# Patient Record
Sex: MALE | Race: WHITE | NOT HISPANIC OR LATINO | Employment: OTHER | URBAN - METROPOLITAN AREA
[De-identification: names, ages, dates, MRNs, and addresses within clinical notes are randomized per-mention and may not be internally consistent; named-entity substitution may affect disease eponyms.]

---

## 2022-03-31 ENCOUNTER — HOSPITAL ENCOUNTER (EMERGENCY)
Facility: CLINIC | Age: 67
Discharge: HOME | End: 2022-03-31
Attending: EMERGENCY MEDICINE | Admitting: EMERGENCY MEDICINE
Payer: COMMERCIAL

## 2022-03-31 ENCOUNTER — NURSE TRIAGE (OUTPATIENT)
Dept: NURSING | Facility: CLINIC | Age: 67
End: 2022-03-31

## 2022-03-31 VITALS
DIASTOLIC BLOOD PRESSURE: 88 MMHG | TEMPERATURE: 97.7 F | RESPIRATION RATE: 13 BRPM | WEIGHT: 225 LBS | SYSTOLIC BLOOD PRESSURE: 139 MMHG | HEART RATE: 79 BPM | OXYGEN SATURATION: 96 %

## 2022-03-31 DIAGNOSIS — R42 DIZZINESS: ICD-10-CM

## 2022-03-31 DIAGNOSIS — I10 HYPERTENSION, UNSPECIFIED TYPE: ICD-10-CM

## 2022-03-31 LAB
ALBUMIN SERPL-MCNC: 4 G/DL (ref 3.5–5)
ALBUMIN UR-MCNC: NEGATIVE MG/DL
ALP SERPL-CCNC: 64 U/L (ref 45–120)
ALT SERPL W P-5'-P-CCNC: 28 U/L (ref 0–45)
ANION GAP SERPL CALCULATED.3IONS-SCNC: 10 MMOL/L (ref 5–18)
APPEARANCE UR: CLEAR
AST SERPL W P-5'-P-CCNC: 23 U/L (ref 0–40)
ATRIAL RATE - MUSE: 83 BPM
BASOPHILS # BLD AUTO: 0 10E3/UL (ref 0–0.2)
BASOPHILS NFR BLD AUTO: 1 %
BILIRUB SERPL-MCNC: 0.9 MG/DL (ref 0–1)
BILIRUB UR QL STRIP: NEGATIVE
BUN SERPL-MCNC: 13 MG/DL (ref 8–22)
CALCIUM SERPL-MCNC: 9.5 MG/DL (ref 8.5–10.5)
CHLORIDE BLD-SCNC: 101 MMOL/L (ref 98–107)
CO2 SERPL-SCNC: 22 MMOL/L (ref 22–31)
COLOR UR AUTO: COLORLESS
CREAT SERPL-MCNC: 0.83 MG/DL (ref 0.7–1.3)
DIASTOLIC BLOOD PRESSURE - MUSE: 108 MMHG
EOSINOPHIL # BLD AUTO: 0.2 10E3/UL (ref 0–0.7)
EOSINOPHIL NFR BLD AUTO: 3 %
ERYTHROCYTE [DISTWIDTH] IN BLOOD BY AUTOMATED COUNT: 12.8 % (ref 10–15)
GFR SERPL CREATININE-BSD FRML MDRD: >90 ML/MIN/1.73M2
GLUCOSE BLD-MCNC: 116 MG/DL (ref 70–125)
GLUCOSE UR STRIP-MCNC: NEGATIVE MG/DL
HCT VFR BLD AUTO: 36.7 % (ref 40–53)
HGB BLD-MCNC: 13.1 G/DL (ref 13.3–17.7)
HGB UR QL STRIP: NEGATIVE
IMM GRANULOCYTES # BLD: 0 10E3/UL
IMM GRANULOCYTES NFR BLD: 0 %
INR PPP: 0.98 (ref 0.85–1.15)
INTERPRETATION ECG - MUSE: NORMAL
KETONES UR STRIP-MCNC: NEGATIVE MG/DL
LEUKOCYTE ESTERASE UR QL STRIP: NEGATIVE
LYMPHOCYTES # BLD AUTO: 1.5 10E3/UL (ref 0.8–5.3)
LYMPHOCYTES NFR BLD AUTO: 24 %
MAGNESIUM SERPL-MCNC: 1.9 MG/DL (ref 1.8–2.6)
MCH RBC QN AUTO: 30.6 PG (ref 26.5–33)
MCHC RBC AUTO-ENTMCNC: 35.7 G/DL (ref 31.5–36.5)
MCV RBC AUTO: 86 FL (ref 78–100)
MONOCYTES # BLD AUTO: 1 10E3/UL (ref 0–1.3)
MONOCYTES NFR BLD AUTO: 16 %
NEUTROPHILS # BLD AUTO: 3.5 10E3/UL (ref 1.6–8.3)
NEUTROPHILS NFR BLD AUTO: 56 %
NITRATE UR QL: NEGATIVE
NRBC # BLD AUTO: 0 10E3/UL
NRBC BLD AUTO-RTO: 0 /100
P AXIS - MUSE: 31 DEGREES
PH UR STRIP: 6 [PH] (ref 5–7)
PLATELET # BLD AUTO: 191 10E3/UL (ref 150–450)
POTASSIUM BLD-SCNC: 3.8 MMOL/L (ref 3.5–5)
PR INTERVAL - MUSE: 168 MS
PROT SERPL-MCNC: 7.4 G/DL (ref 6–8)
QRS DURATION - MUSE: 78 MS
QT - MUSE: 358 MS
QTC - MUSE: 420 MS
R AXIS - MUSE: 28 DEGREES
RBC # BLD AUTO: 4.28 10E6/UL (ref 4.4–5.9)
RBC URINE: 0 /HPF
SODIUM SERPL-SCNC: 133 MMOL/L (ref 136–145)
SP GR UR STRIP: 1.01 (ref 1–1.03)
SYSTOLIC BLOOD PRESSURE - MUSE: 198 MMHG
T AXIS - MUSE: 57 DEGREES
TROPONIN I SERPL-MCNC: <0.01 NG/ML (ref 0–0.29)
UROBILINOGEN UR STRIP-MCNC: <2 MG/DL
VENTRICULAR RATE- MUSE: 83 BPM
WBC # BLD AUTO: 6.2 10E3/UL (ref 4–11)
WBC URINE: 1 /HPF

## 2022-03-31 PROCEDURE — 80053 COMPREHEN METABOLIC PANEL: CPT | Performed by: EMERGENCY MEDICINE

## 2022-03-31 PROCEDURE — 99284 EMERGENCY DEPT VISIT MOD MDM: CPT | Mod: 25

## 2022-03-31 PROCEDURE — 85610 PROTHROMBIN TIME: CPT | Performed by: EMERGENCY MEDICINE

## 2022-03-31 PROCEDURE — 85025 COMPLETE CBC W/AUTO DIFF WBC: CPT | Performed by: EMERGENCY MEDICINE

## 2022-03-31 PROCEDURE — 83735 ASSAY OF MAGNESIUM: CPT | Performed by: EMERGENCY MEDICINE

## 2022-03-31 PROCEDURE — 93005 ELECTROCARDIOGRAM TRACING: CPT | Performed by: EMERGENCY MEDICINE

## 2022-03-31 PROCEDURE — 81001 URINALYSIS AUTO W/SCOPE: CPT | Performed by: EMERGENCY MEDICINE

## 2022-03-31 PROCEDURE — 258N000003 HC RX IP 258 OP 636: Performed by: EMERGENCY MEDICINE

## 2022-03-31 PROCEDURE — 36415 COLL VENOUS BLD VENIPUNCTURE: CPT | Performed by: EMERGENCY MEDICINE

## 2022-03-31 PROCEDURE — 84484 ASSAY OF TROPONIN QUANT: CPT | Performed by: EMERGENCY MEDICINE

## 2022-03-31 PROCEDURE — 96360 HYDRATION IV INFUSION INIT: CPT

## 2022-03-31 RX ADMIN — SODIUM CHLORIDE 1000 ML: 9 INJECTION, SOLUTION INTRAVENOUS at 22:40

## 2022-04-01 NOTE — DISCHARGE INSTRUCTIONS
Follow-up with your primary care provider within the next week for recheck  Your medications as previously prescribed  Return to the emergency department for worsening problems or concerns  Call Dr. Hugo at [phone] if you have any questions until 6 AM

## 2022-04-01 NOTE — ED TRIAGE NOTES
Reports intermittent dizziness/lightheadedness since this morning. States this morning it was when he stood up and then tonight became worse. Reports it is worse when moving head side to side. States had a few days of diarrhea earlier this week. Neuro intact. Denies pain.

## 2022-04-01 NOTE — TELEPHONE ENCOUNTER
"Dominguez has been getting episodes of feeling lightheaded today.  He is currently in a vehicle with his spouse.   They had gone to [facility] ER and left after being notified of the long wait    Earlier today, Dominguez experienced intermittent episodes of lightheadedness that lasted a few minutes.  Tonight, he had a more severe episode that lasted ~30 min or more    Denies feeling palpitations, skipped or extra heart beats    This week he'd been having \"severe\" diarrhea  - Started on Mon, 3/28/22  - Resolved yesterday, Wed, 3/30/22  - He has been drinking fluids and urinating at least every 12 hours or more    **Of Note - He had not taken his regular medications when he had the diarrhea. He restarted the medications today    He reports that he takes:  - Blood thinner  - Atorvastatin  - Prilosec  - Antianxiety med  - Blood pressure med    Advised to see Physician within 24 hours  PCP is with [facility] reports that they just pulled into the parking lot of [facility] ER and is going in to ask about getting his blood pressure checked.    COVID 19 Nurse Triage Plan/Patient Instructions    Please be aware that novel coronavirus (COVID-19) may be circulating in the community. If you develop symptoms such as fever, cough, or SOB or if you have concerns about the presence of another infection including coronavirus (COVID-19), please contact your health care provider or visit [email or web address].     Disposition/Instructions    In-Person Visit with provider recommended. Reference Visit Selection Guide.    Thank you for taking steps to prevent the spread of this virus.  o Limit your contact with others.  o Wear a simple mask to cover your cough.  o Wash your hands well and often.    Resources    [facility]: About COVID-19: [email or web address]    CDC: What to Do If You're Sick: www.cdc.gov/coronavirus/2019-ncov/about/steps-when-sick.html    CDC: Ending Home Isolation: " www.cdc.gov/coronavirus/2019-ncov/hcp/disposition-in-home-patients.html     CDC: Caring for Someone: www.cdc.gov/coronavirus/2019-ncov/if-you-are-sick/care-for-someone.html     [facility]: Interim Guidance for Hospital Discharge to Home: www.health[facility]/diseases/coronavirus/hcp/hospdischarge.pdf    [facility] clinical trials (COVID-19 research studies): [email or web address]     Below are the COVID-19 hotlines at the [place] Department of Health ([facility]). Interpreters are available.   o For health questions: Call 356-328-3889 or 1-825.564.5167 (7 a.m. to 7 p.m.)  o For questions about schools and childcare: Call 833-471-6504 or 1-459.355.7785 (7 a.m. to 7 p.m.)     Aleja Dahl RN  [facility]      Reason for Disposition    [1] MODERATE dizziness (e.g., interferes with normal activities) AND [2] has NOT been evaluated by physician for this  (Exception: dizziness caused by heat exposure, sudden standing, or poor fluid intake)    Additional Information    Negative: Severe difficulty breathing (e.g., struggling for each breath, speaks in single words)    Negative: [1] Difficulty breathing or swallowing AND [2] started suddenly after medicine, an allergic food or bee sting    Negative: Shock suspected (e.g., cold/pale/clammy skin, too weak to stand, low BP, rapid pulse)    Negative: Difficult to awaken or acting confused (e.g., disoriented, slurred speech)    Negative: [1] Weakness (i.e., paralysis, loss of muscle strength) of the face, arm or leg on one side of the body AND [2] sudden onset AND [3] present now    Negative: [1] Numbness (i.e., loss of sensation) of the face, arm or leg on one side of the body AND [2] sudden onset AND [3] present now    Negative: [1] Loss of speech or garbled speech AND [2] sudden onset AND [3] present now    Negative: Overdose (accidental or intentional) of medications    Negative: [1] Fainted > 15 minutes ago AND [2] still  "feels too weak or dizzy to stand    Negative: Heart beating < 50 beats per minute OR > 140 beats per minute    Negative: Sounds like a life-threatening emergency to the triager    Negative: Difficulty breathing    Negative: SEVERE dizziness (e.g., unable to stand, requires support to walk, feels like passing out now)    Negative: Extra heart beats OR irregular heart beating  (i.e., \"palpitations\")    Negative: [1] Drinking very little AND [2] dehydration suspected (e.g., no urine > 12 hours, very dry mouth, very lightheaded)    Negative: Patient sounds very sick or weak to the triager    Negative: [1] Dizziness caused by heat exposure, sudden standing, or poor fluid intake AND [2] no improvement after 2 hours of rest and fluids    Negative: [1] Fever > 103 F (39.4 C) AND [2] not able to get the fever down using Fever Care Advice    Negative: [1] Fever > 101 F (38.3 C) AND [2] age > 60    Negative: [1] Fever > 100.0 F (37.8 C) AND [2] bedridden (e.g., nursing home patient, CVA, chronic illness, recovering from surgery)    Negative: [1] Fever > 100.0 F (37.8 C) AND [2] diabetes mellitus or weak immune system (e.g., HIV positive, cancer chemo, splenectomy, organ transplant, chronic steroids)    Protocols used: DIZZINESS - [id number]      "

## 2022-04-01 NOTE — ED PROVIDER NOTES
EMERGENCY DEPARTMENT ENCOUnter      NAME: Dominguez Mccord  AGE: 67 year old male  YOB: 1955  MRN: [id number]  EVALUATION DATE & TIME: 3/31/2022 10:04 PM    PCP: No primary care provider on file.    ED PROVIDER: Shayla Montoya MD      Chief Complaint   Patient presents with     Dizziness         FINAL IMPRESSION:  1. Dizziness    2. Hypertension, unspecified type          ED COURSE & MEDICAL DECISION MAKING:      In summary, the patient is a 67-year-old male that presents to the emergency department for evaluation of lightheadedness thought likely multifactorial from recent diarrhea and poorly controlled hypertension.  His blood pressure improved in the emergency department without intervention.  We will have him follow-up with primary care for reevaluation of his blood pressure.  His symptoms were improved after IV fluids so it is possible he was mildly dehydrated after his diarrhea.  Diarrhea is improving and likely secondary to a viral illness.    10:15 PM I met the patient and performed my initial interview and exam. PPE: N95 mask and gloves normal saline 1 L IV was administered for IV hydration.  11:14 PM I rechecked and updated the patient. Patient's symptoms have improved. Discussed plans for discharge and patient was agreeable.     At the conclusion of the encounter I discussed the results of all of the tests and the disposition. The questions were answered. The patient or family acknowledged understanding and was agreeable with the care plan.         MEDICATIONS GIVEN IN THE EMERGENCY:  Medications   0.9% sodium chloride BOLUS (0 mLs Intravenous Stopped 3/31/22 0169)       NEW PRESCRIPTIONS STARTED AT TODAY'S ER VISIT  There are no discharge medications for this patient.         =================================================================    HPI        Dominguez Mccord is a 67 year old male with a pertinent history of IBS who presents to this ED via private car for evaluation of  "lightheadedness.    Patient reports intermittent lightheadedness since this morning. He states he has had several episodes where he will stand up and be \"taken back,\" needing to stand for a minute to feel okay. He also notes he \"felt different enough it scared me\". Patient reports his most recent episode happened after dinner when he went downstairs to watch TV. He also endorses ongoing diarrhea since  (3 days ago). He states he had 4-5 episodes on  and then had an episode every 1-1.5 hours. The diarrhea has slowly started to improve today. Of note, the patient's wife reports he recently ate some ham that was about to  the next day. No one else ate the ham or has been experiencing similar symptoms. He had a colonoscopy ~2 weeks ago, which was normal. No recent antibiotics. Patient denies a history of diabetes or heart and lung problems. He takes daily lexapro, Prilosec, potassium, and atorvastatin. He states his blood pressure is usually ~130s. Denies fevers, chills, headaches, weakness, gait problem, chest pain, abdominal pain, vomiting, bloody stool, or any other complaints at this time.     SHx: Patient drinks a moderate amount of alcohol. Denies smoking tobacco. He works at a [employer].     REVIEW OF SYSTEMS     Constitutional:  Denies fever or chills  HENT:  Denies sore throat   Respiratory:  Denies cough or shortness of breath   Cardiovascular:  Denies chest pain or palpitations  GI:  Denies abdominal pain, nausea, or vomiting. Positive for diarrhea  Musculoskeletal:  Denies any new extremity pain, gait problem   Skin:  Denies rash   Neurologic:  Denies headache, focal weakness or sensory changes. Positive for lightheadedness (intermittent)    All other systems reviewed and are negative      PAST MEDICAL HISTORY:  Hypertension, hyperlipidemia, anxiety, GERD        CURRENT MEDICATIONS:    Lipitor, Lexapro, Cozaar, Prilosec, Cialis    ALLERGIES:  No Known Allergies      SOCIAL HISTORY:   Social " History     Socioeconomic History     Marital status:      Spouse name: None     Number of children: None     Years of education: None     Highest education level: None   Occupational History     None   Tobacco Use     Smoking status: None     Smokeless tobacco: None   Substance and Sexual Activity     Alcohol use: None     Drug use: None     Sexual activity: None   Other Topics Concern     None   Social History Narrative     None     Social Determinants of Health     Financial Resource Strain: Not on file   Food Insecurity: Not on file   Transportation Needs: Not on file   Physical Activity: Not on file   Stress: Not on file   Social Connections: Not on file   Intimate Partner Violence: Not on file   Housing Stability: Not on file       VITALS:  Patient Vitals for the past 24 hrs:   BP Temp Temp src Pulse Resp SpO2 Weight   03/31/22 2335 -- 97.7  F (36.5  C) Oral -- -- -- --   03/31/22 2330 139/88 -- -- 79 13 96 % --   03/31/22 2205 (!) 198/108 97.9  F (36.6  C) Oral 88 20 98 % 102.1 kg (225 lb)       PHYSICAL EXAM    Constitutional:  Well developed, Well nourished,  HENT:  Normocephalic, Atraumatic, Bilateral external ears normal, Oropharynx moist, Nose normal.   Neck:  Normal range of motion, No meningismus, No stridor.   Eyes:  EOMI, Conjunctiva normal, No discharge.   Respiratory:  Normal breath sounds, No respiratory distress, No wheezing, No chest tenderness.   Cardiovascular:  Normal heart rate, Normal rhythm, No murmurs  GI:  Soft, No tenderness, No guarding,   Musculoskeletal:  Neurovascularly intact distally, No edema, No tenderness, No cyanosis, Good range of motion in all major joints. No tenderness to palpation or major deformities noted.   Integument:  Warm, Dry, No erythema, No rash.   Lymphatic:  No lymphadenopathy noted.   Neurologic:  Alert & oriented x 3, Normal motor function,  No focal deficits noted.   Psychiatric:  Affect normal, Judgment normal, Mood normal.      LAB:  All pertinent  labs reviewed and interpreted.  Results for orders placed or performed during the hospital encounter of 03/31/22   Result Value Ref Range    INR 0.98 0.85 - 1.15   Comprehensive metabolic panel   Result Value Ref Range    Sodium 133 (L) 136 - 145 mmol/L    Potassium 3.8 3.5 - 5.0 mmol/L    Chloride 101 98 - 107 mmol/L    Carbon Dioxide (CO2) 22 22 - 31 mmol/L    Anion Gap 10 5 - 18 mmol/L    Urea Nitrogen 13 8 - 22 mg/dL    Creatinine 0.83 0.70 - 1.30 mg/dL    Calcium 9.5 8.5 - 10.5 mg/dL    Glucose 116 70 - 125 mg/dL    Alkaline Phosphatase 64 45 - 120 U/L    AST 23 0 - 40 U/L    ALT 28 0 - 45 U/L    Protein Total 7.4 6.0 - 8.0 g/dL    Albumin 4.0 3.5 - 5.0 g/dL    Bilirubin Total 0.9 0.0 - 1.0 mg/dL    GFR Estimate >90 >60 mL/min/1.73m2   Result Value Ref Range    Troponin I <0.01 0.00 - 0.29 ng/mL   UA with Microscopic reflex to Culture    Specimen: Urine, Clean Catch   Result Value Ref Range    Color Urine Colorless Colorless, Straw, Light Yellow, Yellow    Appearance Urine Clear Clear    Glucose Urine Negative Negative mg/dL    Bilirubin Urine Negative Negative    Ketones Urine Negative Negative mg/dL    Specific Gravity Urine 1.006 1.001 - 1.030    Blood Urine Negative Negative    pH Urine 6.0 5.0 - 7.0    Protein Albumin Urine Negative Negative mg/dL    Urobilinogen Urine <2.0 <2.0 mg/dL    Nitrite Urine Negative Negative    Leukocyte Esterase Urine Negative Negative    RBC Urine 0 <=2 /HPF    WBC Urine 1 <=5 /HPF   CBC with platelets and differential   Result Value Ref Range    WBC Count 6.2 4.0 - 11.0 10e3/uL    RBC Count 4.28 (L) 4.40 - 5.90 10e6/uL    Hemoglobin 13.1 (L) 13.3 - 17.7 g/dL    Hematocrit 36.7 (L) 40.0 - 53.0 %    MCV 86 78 - 100 fL    MCH 30.6 26.5 - 33.0 pg    MCHC 35.7 31.5 - 36.5 g/dL    RDW 12.8 10.0 - 15.0 %    Platelet Count 191 150 - 450 10e3/uL    % Neutrophils 56 %    % Lymphocytes 24 %    % Monocytes 16 %    % Eosinophils 3 %    % Basophils 1 %    % Immature Granulocytes 0 %     NRBCs per 100 WBC 0 <1 /100    Absolute Neutrophils 3.5 1.6 - 8.3 10e3/uL    Absolute Lymphocytes 1.5 0.8 - 5.3 10e3/uL    Absolute Monocytes 1.0 0.0 - 1.3 10e3/uL    Absolute Eosinophils 0.2 0.0 - 0.7 10e3/uL    Absolute Basophils 0.0 0.0 - 0.2 10e3/uL    Absolute Immature Granulocytes 0.0 <=0.4 10e3/uL    Absolute NRBCs 0.0 10e3/uL   Result Value Ref Range    Magnesium 1.9 1.8 - 2.6 mg/dL   ECG 12-LEAD WITH MUSE (LHE)   Result Value Ref Range    Systolic Blood Pressure 198 mmHg    Diastolic Blood Pressure 108 mmHg    Ventricular Rate 83 BPM    Atrial Rate 83 BPM    MT Interval 168 ms    QRS Duration 78 ms     ms    QTc 420 ms    P Axis 31 degrees    R AXIS 28 degrees    T Axis 57 degrees    Interpretation ECG       Sinus rhythm  Normal ECG  No previous ECGs available  Confirmed by SEE ED PROVIDER NOTE FOR, ECG INTERPRETATION ([id number]),  DAVE MATUTE ([id number]) on 3/31/2022 10:26:36 PM         EK-rate is 78, sinus, there is no ST segment elevation or depression appreciated.  EKG is unchanged from 2022    I have independently reviewed and interpreted this EKG          I, Denise Humphrey, am serving as a scribe to document services personally performed by Dr. Montoya based on my observation and the provider's statements to me. I, Shayla Montoya MD attest that Denise Humphrey is acting in a scribe capacity, has observed my performance of the services and has documented them in accordance with my direction.    Shayla Montoya MD  Emergency Medicine  [facility] EMERGENCY ROOM  [address] [address] [phone]  Dept: [phone]     Shayla Montoya MD  [id number]       Shayla Montoya MD  [phone]

## 2023-11-27 ENCOUNTER — HOSPITAL ENCOUNTER (EMERGENCY)
Facility: CLINIC | Age: 68
Discharge: HOME | End: 2023-11-27
Attending: STUDENT IN AN ORGANIZED HEALTH CARE EDUCATION/TRAINING PROGRAM | Admitting: STUDENT IN AN ORGANIZED HEALTH CARE EDUCATION/TRAINING PROGRAM
Payer: COMMERCIAL

## 2023-11-27 VITALS
BODY MASS INDEX: 32.73 KG/M2 | RESPIRATION RATE: 18 BRPM | TEMPERATURE: 97.8 F | WEIGHT: 255 LBS | DIASTOLIC BLOOD PRESSURE: 103 MMHG | HEIGHT: 74 IN | OXYGEN SATURATION: 98 % | SYSTOLIC BLOOD PRESSURE: 187 MMHG | HEART RATE: 89 BPM

## 2023-11-27 DIAGNOSIS — S05.01XA ABRASION OF RIGHT CORNEA, INITIAL ENCOUNTER: ICD-10-CM

## 2023-11-27 PROCEDURE — 99283 EMERGENCY DEPT VISIT LOW MDM: CPT

## 2023-11-27 PROCEDURE — 250N000009 HC RX 250: Performed by: STUDENT IN AN ORGANIZED HEALTH CARE EDUCATION/TRAINING PROGRAM

## 2023-11-27 RX ORDER — TETRACAINE HYDROCHLORIDE 5 MG/ML
1-2 SOLUTION OPHTHALMIC ONCE
Status: COMPLETED | OUTPATIENT
Start: 2023-11-27 | End: 2023-11-27

## 2023-11-27 RX ORDER — POLYMYXIN B SULFATE AND TRIMETHOPRIM 1; 10000 MG/ML; [USP'U]/ML
1-2 SOLUTION OPHTHALMIC EVERY 4 HOURS
Qty: 10 ML | Refills: 0 | Status: SHIPPED | OUTPATIENT
Start: 2023-11-27

## 2023-11-27 RX ADMIN — FLUORESCEIN SODIUM 1 STRIP: 1 STRIP OPHTHALMIC at 08:01

## 2023-11-27 RX ADMIN — TETRACAINE HYDROCHLORIDE 2 DROP: 5 SOLUTION OPHTHALMIC at 07:50
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
## 2025-04-28 ENCOUNTER — HOSPITAL ENCOUNTER (EMERGENCY)
Facility: CLINIC | Age: 70
Discharge: HOME | End: 2025-04-28
Payer: COMMERCIAL

## 2025-04-28 ENCOUNTER — APPOINTMENT (OUTPATIENT)
Dept: RADIOLOGY | Facility: CLINIC | Age: 70
End: 2025-04-28
Payer: COMMERCIAL

## 2025-04-28 VITALS
DIASTOLIC BLOOD PRESSURE: 110 MMHG | HEIGHT: 74 IN | HEART RATE: 89 BPM | SYSTOLIC BLOOD PRESSURE: 177 MMHG | BODY MASS INDEX: 34.01 KG/M2 | WEIGHT: 265 LBS | TEMPERATURE: 97.9 F | RESPIRATION RATE: 16 BRPM | OXYGEN SATURATION: 97 %

## 2025-04-28 DIAGNOSIS — R07.9 CHEST PAIN, UNSPECIFIED TYPE: ICD-10-CM

## 2025-04-28 LAB
ANION GAP SERPL CALCULATED.3IONS-SCNC: 12 MMOL/L (ref 7–15)
ATRIAL RATE - MUSE: 87 BPM
BASOPHILS # BLD AUTO: 0 10E3/UL (ref 0–0.2)
BASOPHILS NFR BLD AUTO: 1 %
BUN SERPL-MCNC: 16.2 MG/DL (ref 8–23)
CALCIUM SERPL-MCNC: 9.6 MG/DL (ref 8.8–10.4)
CHLORIDE SERPL-SCNC: 101 MMOL/L (ref 98–107)
CREAT SERPL-MCNC: 0.9 MG/DL (ref 0.67–1.17)
DIASTOLIC BLOOD PRESSURE - MUSE: NORMAL MMHG
EGFRCR SERPLBLD CKD-EPI 2021: >90 ML/MIN/1.73M2
EOSINOPHIL # BLD AUTO: 0.1 10E3/UL (ref 0–0.7)
EOSINOPHIL NFR BLD AUTO: 2 %
ERYTHROCYTE [DISTWIDTH] IN BLOOD BY AUTOMATED COUNT: 13 % (ref 10–15)
GLUCOSE SERPL-MCNC: 91 MG/DL (ref 70–99)
HCO3 SERPL-SCNC: 23 MMOL/L (ref 22–29)
HCT VFR BLD AUTO: 39.7 % (ref 40–53)
HGB BLD-MCNC: 14 G/DL (ref 13.3–17.7)
HOLD SPECIMEN: NORMAL
HOLD SPECIMEN: NORMAL
IMM GRANULOCYTES # BLD: 0 10E3/UL
IMM GRANULOCYTES NFR BLD: 0 %
INTERPRETATION ECG - MUSE: NORMAL
LYMPHOCYTES # BLD AUTO: 1.5 10E3/UL (ref 0.8–5.3)
LYMPHOCYTES NFR BLD AUTO: 21 %
MCH RBC QN AUTO: 30.9 PG (ref 26.5–33)
MCHC RBC AUTO-ENTMCNC: 35.3 G/DL (ref 31.5–36.5)
MCV RBC AUTO: 88 FL (ref 78–100)
MONOCYTES # BLD AUTO: 1 10E3/UL (ref 0–1.3)
MONOCYTES NFR BLD AUTO: 14 %
NEUTROPHILS # BLD AUTO: 4.5 10E3/UL (ref 1.6–8.3)
NEUTROPHILS NFR BLD AUTO: 63 %
NRBC # BLD AUTO: 0 10E3/UL
NRBC BLD AUTO-RTO: 0 /100
P AXIS - MUSE: 38 DEGREES
PLATELET # BLD AUTO: 205 10E3/UL (ref 150–450)
POTASSIUM SERPL-SCNC: 3.9 MMOL/L (ref 3.4–5.3)
PR INTERVAL - MUSE: 164 MS
QRS DURATION - MUSE: 74 MS
QT - MUSE: 334 MS
QTC - MUSE: 401 MS
R AXIS - MUSE: 20 DEGREES
RBC # BLD AUTO: 4.53 10E6/UL (ref 4.4–5.9)
SODIUM SERPL-SCNC: 136 MMOL/L (ref 135–145)
SYSTOLIC BLOOD PRESSURE - MUSE: NORMAL MMHG
T AXIS - MUSE: 42 DEGREES
TROPONIN T SERPL HS-MCNC: 8 NG/L
TROPONIN T SERPL HS-MCNC: 9 NG/L
VENTRICULAR RATE- MUSE: 87 BPM
WBC # BLD AUTO: 7.1 10E3/UL (ref 4–11)

## 2025-04-28 PROCEDURE — 85004 AUTOMATED DIFF WBC COUNT: CPT

## 2025-04-28 PROCEDURE — 85041 AUTOMATED RBC COUNT: CPT

## 2025-04-28 PROCEDURE — 99285 EMERGENCY DEPT VISIT HI MDM: CPT | Mod: 25

## 2025-04-28 PROCEDURE — 84484 ASSAY OF TROPONIN QUANT: CPT

## 2025-04-28 PROCEDURE — 36415 COLL VENOUS BLD VENIPUNCTURE: CPT

## 2025-04-28 PROCEDURE — 93005 ELECTROCARDIOGRAM TRACING: CPT

## 2025-04-28 PROCEDURE — 71046 X-RAY EXAM CHEST 2 VIEWS: CPT

## 2025-04-28 PROCEDURE — 80048 BASIC METABOLIC PNL TOTAL CA: CPT

## 2025-04-28 RX ORDER — HYDROXYZINE HYDROCHLORIDE 25 MG/1
25 TABLET, FILM COATED ORAL 3 TIMES DAILY PRN
Qty: 30 TABLET | Refills: 0 | Status: SHIPPED | OUTPATIENT
Start: 2025-04-28

## 2025-04-28 ASSESSMENT — COLUMBIA-SUICIDE SEVERITY RATING SCALE - C-SSRS
2. HAVE YOU ACTUALLY HAD ANY THOUGHTS OF KILLING YOURSELF IN THE PAST MONTH?: NO
1. IN THE PAST MONTH, HAVE YOU WISHED YOU WERE DEAD OR WISHED YOU COULD GO TO SLEEP AND NOT WAKE UP?: NO
6. HAVE YOU EVER DONE ANYTHING, STARTED TO DO ANYTHING, OR PREPARED TO DO ANYTHING TO END YOUR LIFE?: NO

## 2025-04-28 NOTE — DISCHARGE INSTRUCTIONS
"      Chief Complaint   Patient presents with   • ADHD     Medicine recheck       Sher Gray male 14 y.o. 1 m.o.    History was provided by the mother.    Noticed some improvement on the medicine.  Mom thinks it needs to be increased        The following portions of the patient's history were reviewed and updated as appropriate: allergies, current medications, past family history, past medical history, past social history, past surgical history and problem list.    Current Outpatient Medications   Medication Sig Dispense Refill   • amphetamine-dextroamphetamine XR (Adderall XR) 10 MG 24 hr capsule Take 1 capsule by mouth Every Morning 30 capsule 0   • brompheniramine-pseudoephedrine-DM 30-2-10 MG/5ML syrup Take 2.5 mL by mouth 4 (Four) Times a Day As Needed for Congestion or Cough. 118 mL 0   • cetirizine (zyrTEC) 1 MG/ML syrup Take 5 mL by mouth Daily. 118 mL 0   • lamoTRIgine (LaMICtal) 5 MG chewable tablet chewable tablet Chew 5 mg Daily.     • mupirocin (BACTROBAN) 2 % ointment Apply 1 application topically to the appropriate area as directed 3 (Three) Times a Day. 22 g 2   • predniSONE (DELTASONE) 10 MG tablet Take 10 mg by mouth 2 (Two) Times a Day.     • triamcinolone (KENALOG) 0.025 % cream APPLY ON THE SKIN TWICE DAILY       No current facility-administered medications for this visit.       No Known Allergies        Review of Systems           /64   Ht 148 cm (58.27\")   Wt 62.4 kg (137 lb 8 oz)   BMI 28.47 kg/m²     Physical Exam  Constitutional:       General: He is active.      Appearance: He is well-developed.   HENT:      Right Ear: Tympanic membrane normal.      Left Ear: Tympanic membrane normal.      Nose: Nose normal.      Mouth/Throat:      Mouth: Mucous membranes are moist.      Pharynx: Oropharynx is clear.      Tonsils: No tonsillar exudate.   Eyes:      General:         Right eye: No discharge.         Left eye: No discharge.      Conjunctiva/sclera: Conjunctivae normal. " You were evaluated in the Emergency Department today for chest pain. Your evaluation has shown no signs of medical conditions requiring emergent intervention at this time, however we recommend that you follow up with your primary care physician or your cardiologist as soon as possible for further testing as an outpatient.    Please schedule an appointment for follow up with your primary care physician as directed.  Please schedule an appointment with for follow-up with your cardiologist as directed.  Please do not perform vigorous exercise until cleared by cardiology.      Return to the Emergency Department if you experience worsening or uncontrolled chest pain, shortness of breath, lightheadedness, feeling faint, nausea, vomiting, or any other concerning symptoms.    Thank you for choosing us for your care.       Cardiovascular:      Rate and Rhythm: Normal rate and regular rhythm.      Heart sounds: S1 normal and S2 normal. No murmur heard.  Pulmonary:      Effort: Pulmonary effort is normal. No respiratory distress or retractions.      Breath sounds: Normal breath sounds. No stridor. No wheezing, rhonchi or rales.   Abdominal:      General: Bowel sounds are normal. There is no distension.      Palpations: Abdomen is soft.      Tenderness: There is no abdominal tenderness. There is no guarding or rebound.   Musculoskeletal:         General: Normal range of motion.      Cervical back: Neck supple. No rigidity.      Comments: No scoliosis   Lymphadenopathy:      Cervical: No cervical adenopathy.   Skin:     General: Skin is warm and dry.      Findings: No rash.   Neurological:      Mental Status: He is alert.           Assessment & Plan     Diagnoses and all orders for this visit:    1. Attention deficit hyperactivity disorder (ADHD), combined type (Primary)  -     amphetamine-dextroamphetamine XR (Adderall XR) 10 MG 24 hr capsule; Take 1 capsule by mouth Every Morning  Dispense: 30 capsule; Refill: 0          Return in about 6 months (around 10/28/2023).

## 2025-04-28 NOTE — ED TRIAGE NOTES
Patient states he started having mild mid sternal chest pain yesterday and went away, he awoke with the same pain, he states nothing brings it on, was sent here by primary after they did ECG.      Triage Assessment (Adult)       Row Name 04/28/25 1509          Triage Assessment    Airway WDL WDL        Respiratory WDL    Respiratory WDL WDL        Skin Circulation/Temperature WDL    Skin Circulation/Temperature WDL WDL        Cardiac WDL    Cardiac WDL chest pain        Chest Pain Assessment    Chest Pain Location midsternal     Character aching     Precipitating Factors nothing     Alleviating Factors nothing     Chest Pain Intervention 12-lead ECG obtained        Peripheral/Neurovascular WDL    Peripheral Neurovascular WDL WDL        Cognitive/Neuro/Behavioral WDL    Cognitive/Neuro/Behavioral WDL WDL

## 2025-04-28 NOTE — ED PROVIDER NOTES
EMERGENCY DEPARTMENT ENCOUNTER      NAME: Dominguez Mccord  AGE: 70 year old male  YOB: 1955  MRN: [id number]  EVALUATION DATE & TIME: No admission date for patient encounter.    PCP: Osman Lowry    ED PROVIDER: Sixto Fried MD    FINAL IMPRESSION:  1. Chest pain, unspecified type        ED COURSE & MEDICAL DECISION MAKING:    Pertinent Labs & Imaging studies reviewed. (See chart for details)  70 year old male presents to the Emergency Department for evaluation of chest pain.  Differential diagnosis considered Myocardial infarction, acute coronary syndrome, congestive heart failure, aortic dissection, esophageal rupture, pulmonary embolism, pneumothorax, cardiac tamponade, cocaine mediated chest pain, endocarditis, GERD, pleurisy, rib fracture, costochondritis, pericarditis, herpes zoster.     Triage Note: Patient states he started having mild mid sternal chest pain yesterday and went away, he awoke with the same pain, he states nothing brings it on, was sent here by primary after they did ECG.           ED Course as of 05/01/25 0918   Mon Apr 28, 2025   1539 I met with the patient, obtained history, performed an initial exam, and discussed options and plan for diagnostics and treatment here in the ED.   Patient presenting with central chest tightness that began with diaphoresis while working on a cabin.  Chest x-ray improves with activity and does not radiate or associated with vomiting.  Concerning shortness of breath with exertion and climbing stairs.  Has no sign of DVT or PE on exam.  No hypoxia or significant tachypnea.  Lower concern for PE.  Would be atypical for ACS.  Does have a history of anxiety and states it feels similar however is lasting longer.  Abdomen is soft and nontender doubt intra-abdominal pathology.  Cardiac workup initiated.  EKG is nonischemic and x-ray shows normal sinus rhythm.  No right bundle branch block or S1Q3T3.  Again lower concern for PE.   1803 XR Chest 2  Views  IMPRESSION: Lungs are clear. No hydropneumothorax or fracture. Heart and pulmonary vascularity are normal. No signs of acute disease.   1815 Troponin T, High Sensitivity  Troponin x 2 hide not elevated doubt ACS.  Chest pain would be atypical for cardiac etiology.  Chest x-ray did not reveal pneumonia.  Will send him to cardiology given his age and no previous cardiac workup.  Requesting something for anxiety and was previously taking hydroxyzine with improvement.  Will prescribe this medication.  Doubt aortic dissection or other intrathoracic pathology.  Believe he is stable for outpatient discharge and follow-up in the outpatient setting.  Patient agreeable plan.       Not Applicable    I discussed the plan for discharge with the patient and patient is agreeable. We discussed supportive cares at home and reasons to return to the ER including new or worsening symptoms. All questions and concerns addressed to the best of my ability. Strict return precautions discussed. Patient to be discharge by RN.    At the conclusion of the encounter I discussed the results of the tests and the disposition. The questions were answered. The patient or family acknowledged understanding and was agreeable with the care plan.     MEDICATIONS GIVEN IN THE EMERGENCY:  Medications - No data to display    NEW PRESCRIPTIONS STARTED AT TODAY'S ER VISIT  Discharge Medication List as of 4/28/2025  6:21 PM        START taking these medications    Details   hydrOXYzine HCl (ATARAX) 25 MG tablet Take 1 tablet (25 mg) by mouth 3 times daily as needed for anxiety., Disp-30 tablet, R-0, E-Prescribe           Discharge Medication List as of 4/28/2025  6:21 PM          =================================================================    HPI    Dominguez Mccord is a 70 year old male with a pertinent history of GERD who presents to this ED for evaluation of chest pain.     The patient presents with central chest tightness and diaphoresis that began  "yesterday while working on his cabin. He has a history of anxiety and has had similar chest tightness in the past but this has lasted longer than usual so he came to the ED. His stress has been elevated recently. The chest tightness improves with activity and does not radiate. He has a ~5 year history of shortness of breath with exertion (climbing stairs) that he attributes to gaining weight during COVID in 2020. He has GERD but says that this feels different than GERD. He takes omeprazole. No new leg pain or numbness. No change in his neuropathy.     He denies cough, leg swelling, or any other complaints at this time.       PHYSICAL EXAM    BP (!) 177/110   Pulse 89   Temp 97.9  F (36.6  C) (Temporal)   Resp 16   Ht 1.88 m (6' 2\")   Wt 120.2 kg (265 lb)   SpO2 97%   BMI 34.02 kg/m    Constitutional: Comfortable appearing.  Head: Normocephalic, atraumatic, mucous membranes moist, nose normal.   Neck: Supple, gross ROM intact.   Eyes: Pupils mid-range, sclera white.  Respiratory: Clear to auscultation bilaterally, no respiratory distress, no wheezing, speaks full sentences easily.  Cardiovascular: Normal heart rate, regular rhythm, no murmurs. No lower extremity edema.   GI: Soft, no tenderness to deep palpation in all quadrants.  Musculoskeletal: Moving all 4 extremities intentionally and without pain. No obvious deformity.  Skin: Warm, dry, no rash.  Neurologic: Alert & oriented x 3, speech clear, moving all extremities spontaneously   Psychiatric: Affect normal, cooperative.      LAB:  All pertinent labs reviewed and interpreted.  Results for orders placed or performed during the hospital encounter of 04/28/25   XR Chest 2 Views    Impression    IMPRESSION: Lungs are clear. No hydropneumothorax or fracture. Heart and pulmonary vascularity are normal. No signs of acute disease.   Basic metabolic panel   Result Value Ref Range    Sodium 136 135 - 145 mmol/L    Potassium 3.9 3.4 - 5.3 mmol/L    Chloride 101 98 " - 107 mmol/L    Carbon Dioxide (CO2) 23 22 - 29 mmol/L    Anion Gap 12 7 - 15 mmol/L    Urea Nitrogen 16.2 8.0 - 23.0 mg/dL    Creatinine 0.90 0.67 - 1.17 mg/dL    GFR Estimate >90 >60 mL/min/1.73m2    Calcium 9.6 8.8 - 10.4 mg/dL    Glucose 91 70 - 99 mg/dL   Result Value Ref Range    Troponin T, High Sensitivity 9 <=22 ng/L   CBC with platelets and differential   Result Value Ref Range    WBC Count 7.1 4.0 - 11.0 10e3/uL    RBC Count 4.53 4.40 - 5.90 10e6/uL    Hemoglobin 14.0 13.3 - 17.7 g/dL    Hematocrit 39.7 (L) 40.0 - 53.0 %    MCV 88 78 - 100 fL    MCH 30.9 26.5 - 33.0 pg    MCHC 35.3 31.5 - 36.5 g/dL    RDW 13.0 10.0 - 15.0 %    Platelet Count 205 150 - 450 10e3/uL    % Neutrophils 63 %    % Lymphocytes 21 %    % Monocytes 14 %    % Eosinophils 2 %    % Basophils 1 %    % Immature Granulocytes 0 %    NRBCs per 100 WBC 0 <1 /100    Absolute Neutrophils 4.5 1.6 - 8.3 10e3/uL    Absolute Lymphocytes 1.5 0.8 - 5.3 10e3/uL    Absolute Monocytes 1.0 0.0 - 1.3 10e3/uL    Absolute Eosinophils 0.1 0.0 - 0.7 10e3/uL    Absolute Basophils 0.0 0.0 - 0.2 10e3/uL    Absolute Immature Granulocytes 0.0 <=0.4 10e3/uL    Absolute NRBCs 0.0 10e3/uL   Extra Blue Top Tube   Result Value Ref Range    Hold Specimen JIC    Extra Red Top Tube   Result Value Ref Range    Hold Specimen JIC    Result Value Ref Range    Troponin T, High Sensitivity 8 <=22 ng/L   ECG 12-LEAD WITH MUSE (LHE)   Result Value Ref Range    Systolic Blood Pressure  mmHg    Diastolic Blood Pressure  mmHg    Ventricular Rate 87 BPM    Atrial Rate 87 BPM    GA Interval 164 ms    QRS Duration 74 ms     ms    QTc 401 ms    P Axis 38 degrees    R AXIS 20 degrees    T Axis 42 degrees    Interpretation ECG       Sinus rhythm  Normal ECG  When compared with ECG of 31-Mar-2022 22:21,  No significant change was found  Confirmed by SEE ED PROVIDER NOTE FOR, ECG INTERPRETATION ([id number]),  Luz Yen ([id number]) on 4/28/2025 6:32:56 PM         RADIOLOGY:  Reviewed all  pertinent imaging. Please see official radiology report.  XR Chest 2 Views   Final Result   IMPRESSION: Lungs are clear. No hydropneumothorax or fracture. Heart and pulmonary vascularity are normal. No signs of acute disease.          EKG:    Performed at: April 28, 2025, 3:12 PM, [facility] EMERGENCY ROOM     Impression: Sinus rhythm. Normal ECG. When compared with ECG of 31-Mar-2022, no significant change was found.     Rate: 87 BPM  Rhythm: Sinus rhythm  NJ Interval: 164 ms  QRS Interval: 74 ms  QTc Interval: 334/401 ms  ST Changes: None  Comparison: When compared with ECG of 31-Mar-2022, no significant change was found.     I have independently reviewed and interpreted the EKG(s) documented above.    PROCEDURES:   None      Sixto Fried MD  [facility] EMERGENCY ROOM  [address] [id number]  [phone]   =================================================================    BILLING:  Data  Category 1  Non-ED record review, if applicable. External record reviewed: Reviewed recent office visit seen by outside urgent care and sent to the Emergency Department for evaluation     Clinical information was obtained from an independent historian. History was obtained from: Patient     The following testing was considered but ultimately not selected after discussion with patient/family: N/A     Category 2  My independent interpretation of EKG, rhythm strip, radiology study: Chest x-ray did not reveal large pneumothorax     Category 3  Discussion of management with other physician/healthcare provider/other source: N/A       Risk  Prescription medication was considered, but ultimately not given after discussion with patient/family: I considered ordering a prescription for narcotic pain medicine.  However, I feel patient's condition can be adequately treated with non-narcotic medications and that the risk of a narcotic pain medicine  prescription outweighs the benefits.     Chronic conditions affecting care:  GERD     Consideration of Admission/Observation: Escalation of care including admission/observation was considered given the complexity and risk of the patient's presenting complaint, exam findings, and/or their underlying comorbidities. However, ultimately I feel the patient is safe for outpatient management with close follow up. Reasoning: Work-up reassuring, does not reveal any acute life/organ threatening processes, patient's symptoms well controlled upon reevaluation, reexamination is reassuring, vitals are stable, patient agreeable with discharge, reliable for follow-up.   Consider mission for expedited cardiology follow-up however ACS ruled out by troponins EKG and patient's history.  Believe he can follow-up in the outpatient setting.  Patient agreeable with plan.           I, Maximino Jordan, am serving as a scribe to document services personally performed by Sixto Fried MD based on my observation and the provider's statements to me. I, Sixto Fried MD, attest that Maximino Jordan is acting in a scribe capacity, has observed my performance of the services and has documented them in accordance with my direction.     Sixto Fried MD  05/01/25 0918

## 2025-04-30 ENCOUNTER — OFFICE VISIT (OUTPATIENT)
Dept: CARDIOLOGY | Facility: CLINIC | Age: 70
End: 2025-04-30
Payer: COMMERCIAL

## 2025-04-30 VITALS
DIASTOLIC BLOOD PRESSURE: 56 MMHG | HEART RATE: 92 BPM | BODY MASS INDEX: 34.8 KG/M2 | RESPIRATION RATE: 16 BRPM | HEIGHT: 74 IN | SYSTOLIC BLOOD PRESSURE: 114 MMHG | WEIGHT: 271.2 LBS

## 2025-04-30 DIAGNOSIS — R07.9 CHEST PAIN, UNSPECIFIED TYPE: ICD-10-CM

## 2025-04-30 PROCEDURE — 3078F DIAST BP <80 MM HG: CPT | Performed by: INTERNAL MEDICINE

## 2025-04-30 PROCEDURE — 3074F SYST BP LT 130 MM HG: CPT | Performed by: INTERNAL MEDICINE

## 2025-04-30 PROCEDURE — 99204 OFFICE O/P NEW MOD 45 MIN: CPT | Performed by: INTERNAL MEDICINE

## 2025-04-30 RX ORDER — PREGABALIN 100 MG/1
100 CAPSULE ORAL 2 TIMES DAILY
COMMUNITY
Start: 2025-02-07

## 2025-04-30 RX ORDER — ANTIOX #8/OM3/DHA/EPA/LUT/ZEAX 250-2.5 MG
1 CAPSULE ORAL DAILY
COMMUNITY

## 2025-04-30 RX ORDER — ATORVASTATIN CALCIUM 10 MG/1
1 TABLET, FILM COATED ORAL DAILY
COMMUNITY
Start: 2025-02-07

## 2025-04-30 RX ORDER — ESCITALOPRAM OXALATE 10 MG/1
1 TABLET ORAL DAILY
COMMUNITY
Start: 2024-11-12

## 2025-04-30 RX ORDER — TRIAMCINOLONE ACETONIDE 1 MG/G
CREAM TOPICAL PRN
COMMUNITY
Start: 2024-02-06

## 2025-04-30 RX ORDER — METRONIDAZOLE 10 MG/G
GEL TOPICAL PRN
COMMUNITY

## 2025-04-30 RX ORDER — OMEPRAZOLE 40 MG/1
40 CAPSULE, DELAYED RELEASE ORAL DAILY
COMMUNITY
Start: 2025-02-07

## 2025-04-30 RX ORDER — FLUTICASONE PROPIONATE 50 MCG
2 SPRAY, SUSPENSION (ML) NASAL PRN
COMMUNITY
Start: 2025-01-27

## 2025-04-30 RX ORDER — LOSARTAN POTASSIUM 50 MG/1
100 TABLET ORAL DAILY
COMMUNITY
Start: 2025-02-07

## 2025-04-30 RX ORDER — TADALAFIL 20 MG/1
20 TABLET ORAL PRN
COMMUNITY
Start: 2024-02-06

## 2025-04-30 RX ORDER — TRIAMCINOLONE ACETONIDE 0.1 %
PASTE (GRAM) DENTAL PRN
COMMUNITY
Start: 2024-03-14

## 2025-04-30 NOTE — PROGRESS NOTES
HEART CARE ENCOUNTER CONSULTATON NOTE      [facility]  [phone]      Assessment/Recommendations   Assessment:  1.  Chest pain: Episodic chest pain that seems to be triggered by stress.  Chest pain is not exertional.  He does have some shortness of breath with exertion particularly on incline.  With his risk factors recommend proceeding with stress testing for further evaluation.  Will proceed with an echocardiogram stress test to evaluate for structural heart disease and ischemia.  2.  Prediabetes  3.  Hypertension: Well-controlled  4.  ISHA compliant with CPAP  5.  Dyslipidemia on statin therapy    Plan:  1.  Exercise stress echocardiogram  If above unremarkable may follow-up as needed       History of Present Illness/Subjective    HPI: Dominguez Mccord is a 70 year old male with history of hypertension, dyslipidemia, prediabetes, ISHA on CPAP, GERD, IBS who I am seeing today for an urgent visit due to an episode of chest pain.  He has been having episodes of chest tightness for least the past year.  He experiences mainly related to stress.  He has a high stress job and has noted increased anxiety recently.  He works long hours about 60 hours a week.  [place] and he felt a bit overwhelmed especially when thinking about the storms coming in the next day and returning to work.  He developed chest tightness across his chest and it was more severe than usual and prompted him to come to the ED for further evaluation.  EKG unremarkable and troponin was negative x 2.  Chest pain eventually subsided on its own.  He denies any exertional chest pain.  He does have shortness of breath going up stairs.  He has to go up 30 stairs at his work.  He goes to the gym 2-3 times a week and walks 2 to 3 miles at a time.  Between work and the gym he averages about 10,000 steps a day.  He is hoping to become more active.  His wife is quite sick and he  "helps take care of her as well.  He does not smoke and no significant family history of premature coronary artery disease.    4/28/2025 EKG demonstrates normal sinus rhythm at 87 bpm, normal EKG       Physical Examination  Review of Systems   Vitals: /56 (BP Location: Right arm, Patient Position: Sitting, Cuff Size: Adult Large)   Pulse 92   Resp 16   Ht 1.88 m (6' 2\")   Wt 123 kg (271 lb 3.2 oz)   BMI 34.82 kg/m    BMI= Body mass index is 34.82 kg/m .  Wt Readings from Last 3 Encounters:   04/30/25 123 kg (271 lb 3.2 oz)   04/28/25 120.2 kg (265 lb)   11/27/23 115.7 kg (255 lb)       General Appearance:   no distress, normal body habitus   ENT/Mouth: membranes moist, no oral lesions or bleeding gums.      EYES:  no scleral icterus, normal conjunctivae   Neck: no carotid bruits or thyromegaly   Chest/Lungs:   lungs are clear to auscultation   Cardiovascular:   Regular. Normal first and second heart sounds with no murmur no edema bilaterally    Abdomen:   bowel sounds are present   Extremities: no cyanosis or clubbing   Skin: no xanthelasma, warm.    Neurologic: normal  bilateral, no tremors     Psychiatric: alert and oriented x3, calm        Please refer above for cardiac ROS details.        Medical History  Surgical History Family History Social History   No past medical history on file.  No past surgical history on file.  No family history on file.     Social History     Socioeconomic History    Marital status:      Spouse name: Not on file    Number of children: Not on file    Years of education: Not on file    Highest education level: Not on file   Occupational History    Not on file   Tobacco Use    Smoking status: Never    Smokeless tobacco: Never   Vaping Use    Vaping status: Never Used   Substance and Sexual Activity    Alcohol use: Not on file    Drug use: Never    Sexual activity: Not on file   Other Topics Concern    Not on file   Social History Narrative    Not on file     Social " Drivers of Health     Financial Resource Strain: Low Risk  (2/6/2024)    Received from [facility]    Financial Resource Strain     Difficulty of Paying Living Expenses: 3     Difficulty of Paying Living Expenses: Not on file   Food Insecurity: No Food Insecurity (2/6/2024)    Received from [facility]    Food Insecurity     Do you worry your food will run out before you are able to buy more?: 1   Transportation Needs: No Transportation Needs (2/6/2024)    [facility]    Transportation Needs     Does lack of transportation keep you from medical appointments?: 1     Does lack of transportation keep you from work, meetings or getting things that you need?: 1   Physical Activity: Not on file   Stress: Not on file   Social Connections: Socially Integrated (2/6/2024)    Received from [facility]    Social Connections     Do you often feel lonely or isolated from those around you?: 0   Interpersonal Safety: Not on file   Housing Stability: Low Risk  (2/6/2024)    [facility]    Housing Stability     What is your housing situation today?: 1           Medications  Allergies   Current Outpatient Medications   Medication Sig Dispense Refill    atorvastatin (LIPITOR) 10 MG tablet Take 1 tablet by mouth daily.      escitalopram (LEXAPRO) 10 MG tablet Take 1 tablet by mouth daily.      fluticasone (FLONASE) 50 MCG/ACT nasal spray Spray 2 sprays in nostril as needed.      hydrOXYzine HCl (ATARAX) 25 MG tablet Take 1 tablet (25 mg) by mouth 3 times daily as needed for anxiety. 30 tablet 0    losartan (COZAAR) 50 MG tablet Take 100 mg by mouth daily.      metroNIDAZOLE (METROGEL) 1 % external gel Apply topically as needed.      Multiple Vitamins-Minerals (PRESERVISION AREDS 2) CAPS Take 1 capsule by mouth daily.      omeprazole (PRILOSEC) 40  "MG DR capsule Take 40 mg by mouth daily.      PERMETHRIN EX CPAP () machine for home use at pressure: 10-15 cmw , Choice of mask ( or ) w/full face cushion () x1/mo, nasal cushion () x2/mo, or nasal pillows () x 2/mo; Length of Need: 99 months; Frequency of use: Daily      pregabalin (LYRICA) 100 MG capsule Take 100 mg by mouth 2 times daily.      tadalafil (CIALIS) 20 MG tablet Take 20 mg by mouth as needed.      triamcinolone (KENALOG) 0.1 % external cream Apply topically as needed.      triamcinolone (KENALOG) 0.1 % paste Take by mouth as needed.       No Known Allergies       Lab Results    Chemistry/lipid CBC Cardiac Enzymes/BNP/TSH/INR   No results for input(s): \"CHOL\", \"HDL\", \"LDL\", \"TRIG\", \"CHOLHDLRATIO\" in the last 37429 hours.  No results for input(s): \"LDL\" in the last 13706 hours.  Recent Labs   Lab Test 04/28/25  1540      POTASSIUM 3.9   CHLORIDE 101   CO2 23   GLC 91   BUN 16.2   CR 0.90   GFRESTIMATED >90   MIGUELITO 9.6     Recent Labs   Lab Test 04/28/25  1540 03/31/22 2231   CR 0.90 0.83     No results for input(s): \"A1C\" in the last 77089 hours.       Recent Labs   Lab Test 04/28/25  1540   WBC 7.1   HGB 14.0   HCT 39.7*   MCV 88        Recent Labs   Lab Test 04/28/25  1540 03/31/22 2231   HGB 14.0 13.1*    Recent Labs   Lab Test 03/31/22 2231   TROPONINI <0.01     No results for input(s): \"BNP\", \"NTBNPI\", \"NTBNP\" in the last 81420 hours.  No results for input(s): \"TSH\" in the last 90434 hours.  Recent Labs   Lab Test 03/31/22 2231   INR 0.98        Gemma Kc MD                                      "

## 2025-04-30 NOTE — LETTER
4/30/2025    LEE BRIGGS MD  [address] [place] [address]    RE: Dominguez Mccord       Dear Colleague,     I had the pleasure of seeing Dominguez Mccord in the [facility].    HEART CARE ENCOUNTER CONSULTATON NOTE      [facility]  [phone]      Assessment/Recommendations   Assessment:  1.  Chest pain: Episodic chest pain that seems to be triggered by stress.  Chest pain is not exertional.  He does have some shortness of breath with exertion particularly on incline.  With his risk factors recommend proceeding with stress testing for further evaluation.  Will proceed with an echocardiogram stress test to evaluate for structural heart disease and ischemia.  2.  Prediabetes  3.  Hypertension: Well-controlled  4.  ISHA compliant with CPAP  5.  Dyslipidemia on statin therapy    Plan:  1.  Exercise stress echocardiogram  If above unremarkable may follow-up as needed       History of Present Illness/Subjective    HPI: Dominguez Mccord is a 70 year old male with history of hypertension, dyslipidemia, prediabetes, ISHA on CPAP, GERD, IBS who I am seeing today for an urgent visit due to an episode of chest pain.  He has been having episodes of chest tightness for least the past year.  He experiences mainly related to stress.  He has a high stress job and has noted increased anxiety recently.  He works long hours about 60 hours a week.  [place] and he felt a bit overwhelmed especially when thinking about the storms coming in the next day and returning to work.  He developed chest tightness across his chest and it was more severe than usual and prompted him to come to the ED for further evaluation.  EKG unremarkable and troponin was negative x 2.  Chest pain eventually subsided on its own.  He denies any exertional chest pain.  He does have shortness of breath going up stairs.  He has to go up 30 stairs at his work.  He  "goes to the gym 2-3 times a week and walks 2 to 3 miles at a time.  Between work and the gym he averages about 10,000 steps a day.  He is hoping to become more active.  His wife is quite sick and he helps take care of her as well.  He does not smoke and no significant family history of premature coronary artery disease.    4/28/2025 EKG demonstrates normal sinus rhythm at 87 bpm, normal EKG       Physical Examination  Review of Systems   Vitals: /56 (BP Location: Right arm, Patient Position: Sitting, Cuff Size: Adult Large)   Pulse 92   Resp 16   Ht 1.88 m (6' 2\")   Wt 123 kg (271 lb 3.2 oz)   BMI 34.82 kg/m    BMI= Body mass index is 34.82 kg/m .  Wt Readings from Last 3 Encounters:   04/30/25 123 kg (271 lb 3.2 oz)   04/28/25 120.2 kg (265 lb)   11/27/23 115.7 kg (255 lb)       General Appearance:   no distress, normal body habitus   ENT/Mouth: membranes moist, no oral lesions or bleeding gums.      EYES:  no scleral icterus, normal conjunctivae   Neck: no carotid bruits or thyromegaly   Chest/Lungs:   lungs are clear to auscultation   Cardiovascular:   Regular. Normal first and second heart sounds with no murmur no edema bilaterally    Abdomen:   bowel sounds are present   Extremities: no cyanosis or clubbing   Skin: no xanthelasma, warm.    Neurologic: normal  bilateral, no tremors     Psychiatric: alert and oriented x3, calm        Please refer above for cardiac ROS details.        Medical History  Surgical History Family History Social History   No past medical history on file.  No past surgical history on file.  No family history on file.     Social History     Socioeconomic History     Marital status:      Spouse name: Not on file     Number of children: Not on file     Years of education: Not on file     Highest education level: Not on file   Occupational History     Not on file   Tobacco Use     Smoking status: Never     Smokeless tobacco: Never   Vaping Use     Vaping status: Never " Used   Substance and Sexual Activity     Alcohol use: Not on file     Drug use: Never     Sexual activity: Not on file   Other Topics Concern     Not on file   Social History Narrative     Not on file     Social Drivers of Health     Financial Resource Strain: Low Risk  (2/6/2024)    Received from [employer] [facility]    Financial Resource Strain      Difficulty of Paying Living Expenses: 3      Difficulty of Paying Living Expenses: Not on file   Food Insecurity: No Food Insecurity (2/6/2024)    [facility]    Food Insecurity      Do you worry your food will run out before you are able to buy more?: 1   Transportation Needs: No Transportation Needs (2/6/2024)    Received from [employer] [facility]    Transportation Needs      Does lack of transportation keep you from medical appointments?: 1      Does lack of transportation keep you from work, meetings or getting things that you need?: 1   Physical Activity: Not on file   Stress: Not on file   Social Connections: Socially Integrated (2/6/2024)    Received from [employer] [facility]    Social Connections      Do you often feel lonely or isolated from those around you?: 0   Interpersonal Safety: Not on file   Housing Stability: Low Risk  (2/6/2024)    Received from [employer] [facility]    Housing Stability      What is your housing situation today?: 1           Medications  Allergies   Current Outpatient Medications   Medication Sig Dispense Refill     atorvastatin (LIPITOR) 10 MG tablet Take 1 tablet by mouth daily.       escitalopram (LEXAPRO) 10 MG tablet Take 1 tablet by mouth daily.       fluticasone (FLONASE) 50 MCG/ACT nasal spray Spray 2 sprays in nostril as needed.       hydrOXYzine HCl (ATARAX) 25 MG tablet Take 1 tablet (25 mg) by mouth 3 times daily as needed for anxiety. 30 tablet 0     losartan (COZAAR) 50 MG tablet  "Take 100 mg by mouth daily.       metroNIDAZOLE (METROGEL) 1 % external gel Apply topically as needed.       Multiple Vitamins-Minerals (PRESERVISION AREDS 2) CAPS Take 1 capsule by mouth daily.       omeprazole (PRILOSEC) 40 MG DR capsule Take 40 mg by mouth daily.       PERMETHRIN EX CPAP () machine for home use at pressure: 10-15 cmw , Choice of mask ( or ) w/full face cushion () x1/mo, nasal cushion () x2/mo, or nasal pillows () x 2/mo; Length of Need: 99 months; Frequency of use: Daily       pregabalin (LYRICA) 100 MG capsule Take 100 mg by mouth 2 times daily.       tadalafil (CIALIS) 20 MG tablet Take 20 mg by mouth as needed.       triamcinolone (KENALOG) 0.1 % external cream Apply topically as needed.       triamcinolone (KENALOG) 0.1 % paste Take by mouth as needed.       No Known Allergies       Lab Results    Chemistry/lipid CBC Cardiac Enzymes/BNP/TSH/INR   No results for input(s): \"CHOL\", \"HDL\", \"LDL\", \"TRIG\", \"CHOLHDLRATIO\" in the last 24450 hours.  No results for input(s): \"LDL\" in the last 70117 hours.  Recent Labs   Lab Test 04/28/25  1540      POTASSIUM 3.9   CHLORIDE 101   CO2 23   GLC 91   BUN 16.2   CR 0.90   GFRESTIMATED >90   MIGUELITO 9.6     Recent Labs   Lab Test 04/28/25  1540 03/31/22  2231   CR 0.90 0.83     No results for input(s): \"A1C\" in the last 71402 hours.       Recent Labs   Lab Test 04/28/25  1540   WBC 7.1   HGB 14.0   HCT 39.7*   MCV 88        Recent Labs   Lab Test 04/28/25  1540 03/31/22 2231   HGB 14.0 13.1*    Recent Labs   Lab Test 03/31/22 2231   TROPONINI <0.01     No results for input(s): \"BNP\", \"NTBNPI\", \"NTBNP\" in the last 23841 hours.  No results for input(s): \"TSH\" in the last 02726 hours.  Recent Labs   Lab Test 03/31/22  2231   INR 0.98        Gemma Kc MD                                        Thank you for allowing me to participate in the care of your patient.      Sincerely,     Gemma Kc MD     [facility]  cc:   Sixto Fried MD  [facility] - [address] [address]

## 2025-05-13 ENCOUNTER — HOSPITAL ENCOUNTER (OUTPATIENT)
Dept: CARDIOLOGY | Facility: CLINIC | Age: 70
Discharge: HOME | End: 2025-05-13
Attending: INTERNAL MEDICINE
Payer: COMMERCIAL

## 2025-05-13 DIAGNOSIS — R07.9 CHEST PAIN, UNSPECIFIED TYPE: ICD-10-CM

## 2025-05-13 LAB
CV STRESS CURRENT BP HE: NORMAL
CV STRESS CURRENT HR HE: 105
CV STRESS CURRENT HR HE: 114
CV STRESS CURRENT HR HE: 115
CV STRESS CURRENT HR HE: 125
CV STRESS CURRENT HR HE: 126
CV STRESS CURRENT HR HE: 128
CV STRESS CURRENT HR HE: 131
CV STRESS CURRENT HR HE: 136
CV STRESS CURRENT HR HE: 138
CV STRESS CURRENT HR HE: 139
CV STRESS CURRENT HR HE: 140
CV STRESS CURRENT HR HE: 141
CV STRESS CURRENT HR HE: 142
CV STRESS CURRENT HR HE: 147
CV STRESS CURRENT HR HE: 147
CV STRESS CURRENT HR HE: 81
CV STRESS CURRENT HR HE: 82
CV STRESS CURRENT HR HE: 83
CV STRESS CURRENT HR HE: 84
CV STRESS CURRENT HR HE: 85
CV STRESS CURRENT HR HE: 86
CV STRESS CURRENT HR HE: 87
CV STRESS CURRENT HR HE: 87
CV STRESS CURRENT HR HE: 92
CV STRESS CURRENT HR HE: 92
CV STRESS DEVIATION TIME HE: NORMAL
CV STRESS ECHO PERCENT HR HE: NORMAL
CV STRESS EXERCISE STAGE HE: NORMAL
CV STRESS EXERCISE STAGE REACHED HE: NORMAL
CV STRESS FINAL RESTING BP HE: NORMAL
CV STRESS FINAL RESTING HR HE: 84
CV STRESS MAX HR HE: 147
CV STRESS MAX TREADMILL GRADE HE: 14
CV STRESS MAX TREADMILL SPEED HE: 3.4
CV STRESS PEAK DIA BP HE: NORMAL
CV STRESS PEAK SYS BP HE: NORMAL
CV STRESS PHASE HE: NORMAL
CV STRESS PROTOCOL HE: NORMAL
CV STRESS REASON STOPPED HE: NORMAL
CV STRESS RESTING PT POSITION HE: NORMAL
CV STRESS RESTING PT POSITION HE: NORMAL
CV STRESS ST DEVIATION AMOUNT HE: NORMAL
CV STRESS ST DEVIATION ELEVATION HE: NORMAL
CV STRESS ST EVELATION AMOUNT HE: NORMAL
CV STRESS SYMPTOMS HE: NORMAL
CV STRESS TEST TYPE HE: NORMAL
CV STRESS TOTAL STAGE TIME MIN 1 HE: NORMAL
STRESS ECHO BASELINE DIASTOLIC HE: 93
STRESS ECHO BASELINE HR: NORMAL
STRESS ECHO BASELINE SYSTOLIC BP: 160
STRESS ECHO LAST STRESS DIASTOLIC BP: 78
STRESS ECHO LAST STRESS HR: 147
STRESS ECHO LAST STRESS SYSTOLIC BP: 174
STRESS ECHO POST ESTIMATED WORKLOAD: 8
STRESS ECHO POST EXERCISE DUR MIN: 6
STRESS ECHO POST EXERCISE DUR SEC: 35
STRESS ECHO TARGET HR: 129

## 2025-05-13 PROCEDURE — 93325 DOPPLER ECHO COLOR FLOW MAPG: CPT | Mod: 26 | Performed by: INTERNAL MEDICINE

## 2025-05-13 PROCEDURE — 93016 CV STRESS TEST SUPVJ ONLY: CPT | Performed by: INTERNAL MEDICINE

## 2025-05-13 PROCEDURE — 93018 CV STRESS TEST I&R ONLY: CPT | Performed by: INTERNAL MEDICINE

## 2025-05-13 PROCEDURE — 93350 STRESS TTE ONLY: CPT | Mod: 26 | Performed by: INTERNAL MEDICINE

## 2025-05-13 PROCEDURE — 255N000002 HC RX 255 OP 636: Performed by: INTERNAL MEDICINE

## 2025-05-13 PROCEDURE — C8928 TTE W OR W/O FOL W/CON,STRES: HCPCS

## 2025-05-13 PROCEDURE — 93321 DOPPLER ECHO F-UP/LMTD STD: CPT | Mod: 26 | Performed by: INTERNAL MEDICINE

## 2025-05-13 PROCEDURE — 93352 ADMIN ECG CONTRAST AGENT: CPT | Performed by: INTERNAL MEDICINE

## 2025-05-13 RX ADMIN — PERFLUTREN 5 ML: 6.52 INJECTION, SUSPENSION INTRAVENOUS at 13:20

## 2025-05-16 ENCOUNTER — RESULTS FOLLOW-UP (OUTPATIENT)
Dept: CARDIOLOGY | Facility: CLINIC | Age: 70
End: 2025-05-16

## 2025-06-04 ENCOUNTER — ANCILLARY PROCEDURE (OUTPATIENT)
Dept: CARDIOLOGY | Facility: CLINIC | Age: 70
End: 2025-06-04
Attending: INTERNAL MEDICINE
Payer: COMMERCIAL

## 2025-06-04 DIAGNOSIS — I35.8 AORTIC VALVE SCLEROSIS: ICD-10-CM

## 2025-06-04 DIAGNOSIS — I35.1 MILD AORTIC INSUFFICIENCY: ICD-10-CM

## 2025-06-04 LAB — LVEF ECHO: NORMAL

## 2025-06-04 PROCEDURE — 93306 TTE W/DOPPLER COMPLETE: CPT | Performed by: INTERNAL MEDICINE

## 2025-06-06 ENCOUNTER — RESULTS FOLLOW-UP (OUTPATIENT)
Dept: CARDIOLOGY | Facility: CLINIC | Age: 70
End: 2025-06-06

## 2025-06-06 DIAGNOSIS — I35.8 AORTIC VALVE SCLEROSIS: Primary | ICD-10-CM

## 2025-06-06 DIAGNOSIS — I35.1 MILD AORTIC INSUFFICIENCY: ICD-10-CM
